# Patient Record
Sex: FEMALE | Race: BLACK OR AFRICAN AMERICAN | NOT HISPANIC OR LATINO | Employment: FULL TIME | URBAN - METROPOLITAN AREA
[De-identification: names, ages, dates, MRNs, and addresses within clinical notes are randomized per-mention and may not be internally consistent; named-entity substitution may affect disease eponyms.]

---

## 2018-06-04 ENCOUNTER — TELEPHONE (OUTPATIENT)
Dept: GENETICS | Facility: HOSPITAL | Age: 38
End: 2018-06-04

## 2018-07-03 ENCOUNTER — CONVERSION ENCOUNTER (OUTPATIENT)
Dept: GENERAL RADIOLOGY | Facility: HOSPITAL | Age: 38
End: 2018-07-03

## 2018-07-10 ENCOUNTER — CLINICAL SUPPORT (OUTPATIENT)
Dept: GENETICS | Facility: HOSPITAL | Age: 38
End: 2018-07-10

## 2018-07-10 DIAGNOSIS — Z80.41 FAMILY HISTORY OF OVARIAN CANCER: ICD-10-CM

## 2018-07-10 DIAGNOSIS — Z80.3 FAMILY HISTORY OF BREAST CANCER: Primary | ICD-10-CM

## 2018-07-10 PROCEDURE — 96040: CPT | Performed by: GENETIC COUNSELOR, MS

## 2018-07-10 NOTE — PROGRESS NOTES
Rose Mujica is a 37-year-old female who was referred for genetic counseling due to a family history of breast and ovarian cancer. She is premenopausal and reports having had two negative breast biopsies in the past. Ms. Mujica was 9 at menarche and had her first child at age 20.  She retains her uterus and both ovaries. Her current cancer screening includes annual mammogram and annual clinical breast exam. Ms. Mujica was interested in discussing her risks for breast cancer and genetic testing options.     PERTINENT FAMILY HISTORY:   Mother:  Breast cancer, 36     Ovarian cancer, 35  Mat. Aunt:  Breast cancer, 43  Father:   Prostate cancer, 66    RISK ASSESSMENT:  Ms. Mujica’s family history of breast and ovarian cancer raises the question of a hereditary cancer syndrome. Ms. Mujica’s family does meet NCCN guidelines criteria for BRCA1/2 genetic testing. It is always most informative if an affected relative first pursues genetic testing to determine if there is an identifiable genetic mutation contributing to the risk.  Ms. Mujica’s mother was reported to be pursuing genetic testing this week based on her cancer history, and Ms. Mujica plans to update us when these results are available. If testing is negative for her mother, testing would not be indicated for Ms. Mujica, since if a mutation was present in the family, it would be most likely to be identified in an affected individual.  If her mother does test negative, Ms. Mujica’s lifetime risk of developing breast cancer would be estimated based on family history. If Ms. Mujica’s mother were to test positive, we would then be able to test Ms. Mujica for the specific genetic mutation to determine if she had inherited the identified risk factor. These risk assessments are based on the family history information provided at the time of the appointment.  The assessments could change in the future should new information be obtained.    GENETIC COUNSELING (45 minutes): We  reviewed the family history information in detail.  Cancer is very common; approximately 1 in 3 individuals will develop cancer within a lifetime.  It is not uncommon to see multiple individuals within a family with cancer given the high chance for a person to develop cancer.  The interaction of many factors determines each person’s personal risk, including age, family or personal history of cancer, and external factors such as environmental exposures.  Exactly how these various risk factors interact in an individual is unclear.  Most often, we believe cancer to be a multifactorial disease caused by an accumulation of genetic alterations acquired over our lifetime, with environmental factors acting in the development of a malignancy.    Cancer cases follow three general patterns: sporadic, familial, and hereditary.  While most breast cancer is sporadic, some cases appear to occur in family clusters.  These cases are said to be familial and account for 10-20% of breast cancer cases.  Familial cases may be due to a combination of shared genes and environmental factors among family members.  In even fewer families, the cancer is said to be inherited, and the genes responsible for the cancer are known.  Family histories typical of hereditary cancer syndromes usually include multiple first- and second-degree relatives diagnosed with cancer types that define a syndrome.  These cases tend to be diagnosed at younger-than-expected ages and can be bilateral or multifocal.  The cancer in these families follows an autosomal dominant inheritance pattern, which indicates the likely presence of a mutation in a cancer susceptibility gene.  Children and siblings of an individual believed to carry this mutation have a 50% chance of inheriting that mutation, thereby inheriting the increased risk to develop cancer.  These mutations can be passed down from the maternal or the paternal lineage.     Hereditary breast cancer accounts for  5-10% of all cases of breast cancer.  A significant proportion of hereditary breast cancer can be attributed to mutations in the BRCA1 and BRCA2 genes.  Mutations in these genes confer an increased risk for breast cancer, ovarian cancer, male breast cancer, prostate cancer, and pancreatic cancer. In addition to BRCA1 and BRCA2, there are other genes associated with hereditary cancer that can be evaluated on a multi-gene panel. Ms. Mujica’s mother is an appropriate candidate for testing to evaluate for cancer susceptibility genes.  If her mother’s testing is negative, a risk assessment will be performed to quantify Ms. Mujica’s breast cancer risk.  If her mother’s testing is positive, Ms. Mujica could then be tested for the identified mutation to determine if she has or has not inherited that hereditary risk factor.      PLAN:   Ms. Mujica’s mother’s testing is pending, and recommendations for Ms. Mujica will be dependent on those results. Ms. Mujica plans to contact us when her mother’s results are available. Genetic counseling remains available to Ms. Mujica and her family.   If Ms. Mujica has any questions, concerns, or updates to the family history she is welcome to call us at [phone].      Alexandra Magnante   Genetic Counseling Student    Cc: Rose Bingham MD

## 2018-10-01 ENCOUNTER — CONVERSION ENCOUNTER (OUTPATIENT)
Dept: MAMMOGRAPHY | Facility: HOSPITAL | Age: 38
End: 2018-10-01